# Patient Record
Sex: MALE | Race: WHITE | Employment: FULL TIME | URBAN - METROPOLITAN AREA
[De-identification: names, ages, dates, MRNs, and addresses within clinical notes are randomized per-mention and may not be internally consistent; named-entity substitution may affect disease eponyms.]

---

## 2019-02-23 ENCOUNTER — OFFICE VISIT (OUTPATIENT)
Dept: URGENT CARE | Facility: URGENT CARE | Age: 49
End: 2019-02-23
Payer: COMMERCIAL

## 2019-02-23 VITALS
WEIGHT: 177.13 LBS | SYSTOLIC BLOOD PRESSURE: 106 MMHG | HEART RATE: 81 BPM | TEMPERATURE: 98.1 F | OXYGEN SATURATION: 93 % | DIASTOLIC BLOOD PRESSURE: 66 MMHG | RESPIRATION RATE: 20 BRPM

## 2019-02-23 DIAGNOSIS — Z77.22: ICD-10-CM

## 2019-02-23 DIAGNOSIS — J45.901 MILD ASTHMA WITH EXACERBATION, UNSPECIFIED WHETHER PERSISTENT: Primary | ICD-10-CM

## 2019-02-23 PROCEDURE — 99214 OFFICE O/P EST MOD 30 MIN: CPT | Performed by: PHYSICIAN ASSISTANT

## 2019-02-23 RX ORDER — ALBUTEROL SULFATE 90 UG/1
2 AEROSOL, METERED RESPIRATORY (INHALATION) EVERY 6 HOURS
Qty: 1 INHALER | Refills: 0 | Status: SHIPPED | OUTPATIENT
Start: 2019-02-23 | End: 2020-02-05

## 2019-02-23 RX ORDER — PREDNISONE 20 MG/1
20 TABLET ORAL 2 TIMES DAILY
Qty: 10 TABLET | Refills: 0 | Status: SHIPPED | OUTPATIENT
Start: 2019-02-23 | End: 2019-02-28

## 2019-03-06 NOTE — PROGRESS NOTES
SUBJECTIVE:   Pradeep Swanson is a 48 year old male presenting with a chief complaint of having asthma flare up.  Onset of symptoms was 1 day(s) ago.  Course of illness is same.    Severity moderate  Current and Associated symptoms: wheezing  Treatment measures tried include OTC Cough med.  Predisposing factors include HX of asthma.    PMH:  Asthma    ALLERGIES   No Known Allergies      Social History     Tobacco Use     Smoking status: Never Smoker     Smokeless tobacco: Never Used   Substance Use Topics     Alcohol use: Not on file     Family Hx  Allergies  CVD    ROS:  CONSTITUTIONAL:NEGATIVE for fever, chills, change in weight  INTEGUMENTARY/SKIN: NEGATIVE for worrisome rashes, moles or lesions  EYES: NEGATIVE for vision changes or irritation  ENT/MOUTH: NEGATIVE for ear, mouth and throat problems  RESP:POSITIVE for cough-non productive, Hx asthma and wheezing  CV: NEGATIVE for chest pain, palpitations or peripheral edema  GI: NEGATIVE for nausea, abdominal pain, heartburn, or change in bowel habits  : negative for and dysuria  MUSCULOSKELETAL: NEGATIVE for significant arthralgias or myalgia  NEURO: NEGATIVE for weakness, dizziness or paresthesias    OBJECTIVE  :/66 (Cuff Size: Adult Regular)   Pulse 81   Temp 98.1  F (36.7  C) (Oral)   Resp 20   Wt 80.3 kg (177 lb 2 oz)   SpO2 93%   GENERAL APPEARANCE: healthy, alert and no distress  EYES: EOMI,  PERRL, conjunctiva clear  HENT: ear canals and TM's normal.  Nose and mouth without ulcers, erythema or lesions  NECK: supple, nontender, no lymphadenopathy  RESP: expiratory wheezes generalized  CV: regular rates and rhythm, normal S1 S2, no murmur noted  ABDOMEN:  soft, nontender, no HSM or masses and bowel sounds normal  NEURO: Normal strength and tone, sensory exam grossly normal,  normal speech and mentation  SKIN: no suspicious lesions or rashes    ASSESSMENT/PLAN      ICD-10-CM    1. Mild asthma with exacerbation, unspecified whether persistent  J45.901 albuterol (PROVENTIL HFA) 108 (90 Base) MCG/ACT inhaler     predniSONE (DELTASONE) 20 MG tablet   2. Exposed to tobacco smoke by family members smoking indoors Z77.22 albuterol (PROVENTIL HFA) 108 (90 Base) MCG/ACT inhaler       Orders Placed This Encounter     ALBUTEROL IN     albuterol (PROVENTIL HFA) 108 (90 Base) MCG/ACT inhaler     predniSONE (DELTASONE) 20 MG tablet       Albuterol and prednisone for asthma exacerbation  Follow up with PCP as needed  See orders in Epic

## 2019-03-24 ENCOUNTER — OFFICE VISIT (OUTPATIENT)
Dept: URGENT CARE | Facility: URGENT CARE | Age: 49
End: 2019-03-24
Payer: COMMERCIAL

## 2019-03-24 VITALS
WEIGHT: 173 LBS | DIASTOLIC BLOOD PRESSURE: 70 MMHG | RESPIRATION RATE: 24 BRPM | OXYGEN SATURATION: 92 % | SYSTOLIC BLOOD PRESSURE: 120 MMHG | HEART RATE: 116 BPM | TEMPERATURE: 97.5 F

## 2019-03-24 DIAGNOSIS — J98.01 ACUTE BRONCHOSPASM: Primary | ICD-10-CM

## 2019-03-24 DIAGNOSIS — J45.901 REACTIVE AIRWAY DISEASE WITH ACUTE EXACERBATION, UNSPECIFIED ASTHMA SEVERITY, UNSPECIFIED WHETHER PERSISTENT: ICD-10-CM

## 2019-03-24 PROCEDURE — 94640 AIRWAY INHALATION TREATMENT: CPT | Performed by: FAMILY MEDICINE

## 2019-03-24 PROCEDURE — 99214 OFFICE O/P EST MOD 30 MIN: CPT | Mod: 25 | Performed by: FAMILY MEDICINE

## 2019-03-24 RX ORDER — DIPHENHYDRAMINE HCL 25 MG
25-50 TABLET ORAL EVERY 6 HOURS PRN
Qty: 30 TABLET | Refills: 1 | Status: SHIPPED | OUTPATIENT
Start: 2019-03-24 | End: 2020-02-05

## 2019-03-24 RX ORDER — PREDNISONE 20 MG/1
40 TABLET ORAL DAILY
Qty: 10 TABLET | Refills: 0 | Status: SHIPPED | OUTPATIENT
Start: 2019-03-24 | End: 2019-03-29

## 2019-03-24 RX ORDER — PREDNISONE 10 MG/1
40 TABLET ORAL ONCE
Status: COMPLETED | OUTPATIENT
Start: 2019-03-24 | End: 2019-03-24

## 2019-03-24 RX ORDER — IPRATROPIUM BROMIDE AND ALBUTEROL SULFATE 2.5; .5 MG/3ML; MG/3ML
3 SOLUTION RESPIRATORY (INHALATION) ONCE
Status: COMPLETED | OUTPATIENT
Start: 2019-03-24 | End: 2019-03-24

## 2019-03-24 RX ORDER — ALBUTEROL SULFATE 90 UG/1
2 AEROSOL, METERED RESPIRATORY (INHALATION) EVERY 4 HOURS PRN
Qty: 8.5 G | Refills: 1 | Status: SHIPPED | OUTPATIENT
Start: 2019-03-24

## 2019-03-24 RX ADMIN — PREDNISONE 40 MG: 10 TABLET ORAL at 13:46

## 2019-03-24 RX ADMIN — IPRATROPIUM BROMIDE AND ALBUTEROL SULFATE 3 ML: 2.5; .5 SOLUTION RESPIRATORY (INHALATION) at 13:50

## 2019-03-24 NOTE — PATIENT INSTRUCTIONS
Patient Education     Bronchospasm (Adult)    Bronchospasm occurs when the airways (bronchial tubes) go into spasm and contract. This makes it hard to breathe and causes wheezing (a high-pitched whistling sound). Bronchospasm can also cause frequent coughing without wheezing.  Bronchospasm is due to irritation, inflammation, or allergic reaction of the airways. People with asthma get bronchospasm. However, not everyone with bronchospasm has asthma.  Being exposed to harmful fumes, a recent case of bronchitis, exercise, or a flare-up of chronic obstructive pulmonary disease (COPD) may cause the airways to spasm. An episode of bronchospasm may last 7 to 14 days. Medicine may be prescribed to relax the airways and prevent wheezing. Antibiotics will be prescribed only if your healthcare provider thinks there is a bacterial infection. Antibiotics do not help a viral infection.  Home care    Drink lots of water or other fluids (at least 10 glasses a day) during an attack. This will loosen lung secretions and make it easier to breathe. If you have heart or kidney disease, check with your doctor before you drink extra fluids.    Take prescribed medicine exactly at the times advised. If you take an inhaled medicine to help with breathing, don't use it more than once every 4 hours, unless told to do so. If prescribed an antibiotic or prednisone, take all of the medicine, even if you are feeling better after a few days.    Don't smoke. Also avoid being exposed to secondhand smoke.    If you were given an inhaler, use it exactly as directed. If you need to use it more often than prescribed, your condition may be getting worse. Contact your healthcare provider.  Follow-up care  Follow up with your healthcare provider, or as advised.  If you are age 65 or older, have a chronic lung disease or condition that affects your immune system, or you smoke, ask your healthcare provider about getting a pneumococcal vaccine, as well as a  yearly flu shot (influenza vaccine).  When to seek medical advice  Call your healthcare provider right away if any of these occur:    You need to use your inhalers more often than usual    Fever of 100.4 F (38 C) or higher, or as directed by your healthcare provider    Cough that brings up lots of dark-colored sputum (mucus)    You don't get better within 24 hours  Call 911  Call 911 if any of these occur:    Coughing up bloody sputum (mucus)    Chest pain with each breath    Increased wheezing or shortness of breath   Date Last Reviewed: 6/1/2018 2000-2018 [employer]. [address] [place] [address]. All rights reserved. This information is not intended as a substitute for professional medical care. Always follow your healthcare professional's instructions.           Patient Education     Asthma (Adult)  Asthma is a disease where the medium and  small air passages within the lung go into spasm and restrict the flow of air. Inflammation and swelling of the airways cause further blockage. During an acute asthma attack, these factors cause trouble breathing, wheezing, cough and chest tightness.    An asthma attack can be triggered by many things. Common triggers include infections such as the common cold, bronchitis, and pneumonia. Irritants such as smoke or pollutants in the air, very cold air, emotional upset, and exercise can also trigger an attack. In many adults with asthma, allergies to dust, mold, pollen and animal dander can cause an asthma attack. Skipping doses of daily asthma medicine can also bring on an asthma attack.  Asthma can be controlled using the proper medicines prescribed by your healthcare provider and avoiding exposure to known triggers including allergens and irritants.  Home care    Take prescribed medicine exactly at the times advised. If you need medicine such as from a hand held inhaler or aerosol breathing machine more than every 4 hours, contact your  "healthcare provider or seek immediate medical attention. If prescribed an antibiotic or prednisone, take all of the medicine as prescribed, even if you are feeling better after a few days.    Don't smoke. Avoid being exposed to the smoke of others.    Some people with asthma have worsening of their symptoms when they take aspirin and non-steroidal or fever-reducing medicines like ibuprofen and naproxen. Talk to your healthcare provider if you think this may apply to you.  Follow-up care  Follow up with your healthcare provider, or as advised. Always bring all of your current medicines to any appointments with your healthcare provider. Also bring a complete list of medicines even those not taken for asthma. If you don't already have one, talk to your healthcare provider about developing your own \"Asthma Action Plan.\"  A pneumococcal (pneumonia) vaccine and yearly flu shot (every fall) are recommended. Ask your doctor about this.  When to seek medical advice  Call your healthcare provider right away if any of these occur:     Increased wheezing or shortness of breath    Need to use your inhalers more often than usual without relief    Fever of 100.4 F (38 C) or higher, or as directed by your healthcare provider    Coughing up lots of dark-colored or bloody sputum (mucus)    Chest pain with each breath    If you use a peak flow meter as part of an Asthma Action Plan, and you are still in the yellow zone (50% to 80%) 15 minutes after using inhaler medicine.  Call 911  Call 911 if any of the following occur    Trouble walking or talking because of shortness of breath    If you use a peak flow meter as part of an Asthma Action Plan and you are still in the red zone (less than 50%) 15 minutes after using inhaler medicine    Lips or fingernails turning gray or blue  Date Last Reviewed: 5/1/2017 2000-2018 [employer]. [address] [place] [address], [place] [address]. All rights reserved. This information is not " intended as a substitute for professional medical care. Always follow your healthcare professional's instructions.

## 2019-03-27 NOTE — PROGRESS NOTES
SUBJECTIVE:  Pradeep Swanson, a 48 year old male scheduled an appointment to discuss the following issues:     Acute bronchospasm  Reactive airway disease with acute exacerbation, unspecified asthma severity, unspecified whether persistent    Medical, social, surgical, and family histories reviewed.    Breathing Problem (shortness of breath for 2 days)  Last summer pt had a first episode of smoke exposure (pt is not himself a smoker but his friend smokes) where he was treated with nebulizer, Prednisone and inhalers.    1 month ago his friend smoked around him again and he woke up SOB the next morning.  This is now the third time his roommate smoke around him and he is feeling SOB and wheezing again.  No previous formal diagnosis of asthma.      ROS:  See HPI.  No nausea/vomiting.  No fever/chills.  No chest pain; some SOB.  No BM/urine problems.  No dizziness or syncope.      OBJECTIVE:  /70 (Cuff Size: Adult Regular)   Pulse 116   Temp 97.5  F (36.4  C) (Oral)   Resp 24   Wt 78.5 kg (173 lb)   SpO2 92%   EXAM:  GENERAL APPEARANCE: alert and mild distress; mild tachycardia; afebrile; no cyanosis or accessory muscle use; moist mucus membrane  EYES: Eyes grossly normal to inspection, PERRL and conjunctivae and sclerae normal  HENT: ear canals and TM's normal and nose and mouth without ulcers or lesions  NECK: no adenopathy, no asymmetry, masses, or scars and thyroid normal to palpation  RESP: mild scattered wheezes, fair air entry bilaterally; no crackles  CV: regular rates and rhythm, normal S1 S2, no S3 or S4 and no murmur, click or rub  LYMPHATICS: no cervical adenopathy  ABDOMEN: soft, nontender, without hepatosplenomegaly or masses and bowel sounds normal  MS: extremities normal- no gross deformities noted  SKIN: no suspicious lesions or rashes  NEURO: Normal strength and tone, mentation intact and speech normal      ASSESSMENT/PLAN:  (J98.01) Acute bronchospasm  (primary encounter  diagnosis)  Comment: Duoneb given in clinic and pt improved significantly  Plan: ipratropium - albuterol 0.5 mg/2.5 mg/3 mL         (DUONEB) neb solution 3 mL, predniSONE         (DELTASONE) tablet 40 mg, ALBUTEROL/IPRATROPIUM        3ML NEB, albuterol (PROAIR HFA/PROVENTIL         HFA/VENTOLIN HFA) 108 (90 Base) MCG/ACT         inhaler, predniSONE (DELTASONE) 20 MG tablet,         diphenhydrAMINE (BENADRYL) 25 MG tablet      (J45.901) Reactive airway disease with acute exacerbation, unspecified asthma severity, unspecified whether persistent  Comment:  Related to smoke exposure  Plan: PULMONARY MEDICINE REFERRAL         Pt to f/up PCP within 2-3 weeks, sooner if no improvement or new problems arise.  Warning signs and symptoms explained---be seen ASAP if worsening.

## 2020-02-05 ENCOUNTER — OFFICE VISIT (OUTPATIENT)
Dept: URGENT CARE | Facility: URGENT CARE | Age: 50
End: 2020-02-05
Payer: COMMERCIAL

## 2020-02-05 ENCOUNTER — APPOINTMENT (OUTPATIENT)
Dept: GENERAL RADIOLOGY | Facility: CLINIC | Age: 50
DRG: 193 | End: 2020-02-05
Attending: PHYSICIAN ASSISTANT
Payer: COMMERCIAL

## 2020-02-05 ENCOUNTER — HOSPITAL ENCOUNTER (INPATIENT)
Facility: CLINIC | Age: 50
LOS: 2 days | Discharge: HOME | DRG: 193 | End: 2020-02-07
Attending: PHYSICIAN ASSISTANT | Admitting: HOSPITALIST
Payer: COMMERCIAL

## 2020-02-05 ENCOUNTER — APPOINTMENT (OUTPATIENT)
Dept: CT IMAGING | Facility: CLINIC | Age: 50
DRG: 193 | End: 2020-02-05
Attending: PHYSICIAN ASSISTANT
Payer: COMMERCIAL

## 2020-02-05 VITALS
SYSTOLIC BLOOD PRESSURE: 118 MMHG | OXYGEN SATURATION: 86 % | DIASTOLIC BLOOD PRESSURE: 80 MMHG | HEART RATE: 148 BPM | WEIGHT: 168 LBS | TEMPERATURE: 101 F

## 2020-02-05 DIAGNOSIS — J20.9 ACUTE BRONCHITIS, UNSPECIFIED ORGANISM: ICD-10-CM

## 2020-02-05 DIAGNOSIS — J45.31 MILD PERSISTENT ASTHMA WITH EXACERBATION: ICD-10-CM

## 2020-02-05 DIAGNOSIS — J10.1 INFLUENZA A: ICD-10-CM

## 2020-02-05 DIAGNOSIS — R05.9 COUGH: ICD-10-CM

## 2020-02-05 DIAGNOSIS — J45.901 SEVERE ASTHMA WITH EXACERBATION, UNSPECIFIED WHETHER PERSISTENT: ICD-10-CM

## 2020-02-05 DIAGNOSIS — R91.8 PULMONARY NODULES: ICD-10-CM

## 2020-02-05 DIAGNOSIS — J96.01 ACUTE RESPIRATORY FAILURE WITH HYPOXIA (H): Primary | ICD-10-CM

## 2020-02-05 DIAGNOSIS — R06.03 ACUTE RESPIRATORY DISTRESS: ICD-10-CM

## 2020-02-05 DIAGNOSIS — R09.02 HYPOXIA: ICD-10-CM

## 2020-02-05 DIAGNOSIS — R00.0 TACHYCARDIA: ICD-10-CM

## 2020-02-05 DIAGNOSIS — R09.02 HYPOXIA: Primary | ICD-10-CM

## 2020-02-05 DIAGNOSIS — R68.83 CHILLS: ICD-10-CM

## 2020-02-05 LAB
ABO + RH BLD: NORMAL
ABO + RH BLD: NORMAL
ALBUMIN SERPL-MCNC: 4.6 G/DL (ref 3.4–5)
ALP SERPL-CCNC: 66 U/L (ref 40–150)
ALT SERPL W P-5'-P-CCNC: 35 U/L (ref 0–70)
ANION GAP SERPL CALCULATED.3IONS-SCNC: 6 MMOL/L (ref 3–14)
AST SERPL W P-5'-P-CCNC: 16 U/L (ref 0–45)
BASE DEFICIT BLDA-SCNC: 3 MMOL/L
BASOPHILS # BLD AUTO: 0 10E9/L (ref 0–0.2)
BASOPHILS NFR BLD AUTO: 0.2 %
BILIRUB SERPL-MCNC: 0.3 MG/DL (ref 0.2–1.3)
BLD GP AB SCN SERPL QL: NORMAL
BLOOD BANK CMNT PATIENT-IMP: NORMAL
BUN SERPL-MCNC: 16 MG/DL (ref 7–30)
CALCIUM SERPL-MCNC: 9.4 MG/DL (ref 8.5–10.1)
CHLORIDE SERPL-SCNC: 107 MMOL/L (ref 94–109)
CO2 SERPL-SCNC: 25 MMOL/L (ref 20–32)
CREAT SERPL-MCNC: 0.96 MG/DL (ref 0.66–1.25)
D DIMER PPP FEU-MCNC: 0.3 UG/ML FEU (ref 0–0.5)
DIFFERENTIAL METHOD BLD: ABNORMAL
EOSINOPHIL # BLD AUTO: 0.5 10E9/L (ref 0–0.7)
EOSINOPHIL NFR BLD AUTO: 3.4 %
ERYTHROCYTE [DISTWIDTH] IN BLOOD BY AUTOMATED COUNT: 12.6 % (ref 10–15)
FLUAV+FLUBV AG SPEC QL: NEGATIVE
FLUAV+FLUBV AG SPEC QL: NEGATIVE
FLUAV+FLUBV RNA SPEC QL NAA+PROBE: NEGATIVE
FLUAV+FLUBV RNA SPEC QL NAA+PROBE: POSITIVE
GFR SERPL CREATININE-BSD FRML MDRD: >90 ML/MIN/{1.73_M2}
GLUCOSE BLDC GLUCOMTR-MCNC: 174 MG/DL (ref 70–99)
GLUCOSE SERPL-MCNC: 113 MG/DL (ref 70–99)
HCO3 BLD-SCNC: 21 MMOL/L (ref 21–28)
HCT VFR BLD AUTO: 46.8 % (ref 40–53)
HGB BLD-MCNC: 15.9 G/DL (ref 13.3–17.7)
IMM GRANULOCYTES # BLD: 0 10E9/L (ref 0–0.4)
IMM GRANULOCYTES NFR BLD: 0.1 %
LACTATE BLD-SCNC: 1.5 MMOL/L (ref 0.7–2)
LIPASE SERPL-CCNC: 59 U/L (ref 73–393)
LYMPHOCYTES # BLD AUTO: 0.8 10E9/L (ref 0.8–5.3)
LYMPHOCYTES NFR BLD AUTO: 5.7 %
MAGNESIUM SERPL-MCNC: 1.9 MG/DL (ref 1.6–2.3)
MCH RBC QN AUTO: 30.9 PG (ref 26.5–33)
MCHC RBC AUTO-ENTMCNC: 34 G/DL (ref 31.5–36.5)
MCV RBC AUTO: 91 FL (ref 78–100)
MONOCYTES # BLD AUTO: 0.5 10E9/L (ref 0–1.3)
MONOCYTES NFR BLD AUTO: 3.5 %
NEUTROPHILS # BLD AUTO: 11.9 10E9/L (ref 1.6–8.3)
NEUTROPHILS NFR BLD AUTO: 87.1 %
NRBC # BLD AUTO: 0 10*3/UL
NRBC BLD AUTO-RTO: 0 /100
O2/TOTAL GAS SETTING VFR VENT: ABNORMAL %
OXYHGB MFR BLD: 89 % (ref 92–100)
PCO2 BLD: 32 MM HG (ref 35–45)
PH BLD: 7.42 PH (ref 7.35–7.45)
PLATELET # BLD AUTO: 232 10E9/L (ref 150–450)
PO2 BLD: 54 MM HG (ref 80–105)
POTASSIUM SERPL-SCNC: 3.6 MMOL/L (ref 3.4–5.3)
PROCALCITONIN SERPL-MCNC: 0.27 NG/ML
PROT SERPL-MCNC: 8.3 G/DL (ref 6.8–8.8)
RBC # BLD AUTO: 5.14 10E12/L (ref 4.4–5.9)
RSV RNA SPEC NAA+PROBE: NEGATIVE
SODIUM SERPL-SCNC: 138 MMOL/L (ref 133–144)
SPECIMEN EXP DATE BLD: NORMAL
SPECIMEN SOURCE: ABNORMAL
SPECIMEN SOURCE: NORMAL
TROPONIN I SERPL-MCNC: <0.015 UG/L (ref 0–0.04)
WBC # BLD AUTO: 13.6 10E9/L (ref 4–11)

## 2020-02-05 PROCEDURE — 25000128 H RX IP 250 OP 636: Performed by: HOSPITALIST

## 2020-02-05 PROCEDURE — 86850 RBC ANTIBODY SCREEN: CPT | Performed by: PHYSICIAN ASSISTANT

## 2020-02-05 PROCEDURE — 25000125 ZZHC RX 250: Performed by: PHYSICIAN ASSISTANT

## 2020-02-05 PROCEDURE — 83605 ASSAY OF LACTIC ACID: CPT | Performed by: PHYSICIAN ASSISTANT

## 2020-02-05 PROCEDURE — 25800030 ZZH RX IP 258 OP 636: Performed by: HOSPITALIST

## 2020-02-05 PROCEDURE — 94640 AIRWAY INHALATION TREATMENT: CPT | Performed by: PHYSICIAN ASSISTANT

## 2020-02-05 PROCEDURE — 25800030 ZZH RX IP 258 OP 636: Performed by: EMERGENCY MEDICINE

## 2020-02-05 PROCEDURE — 93005 ELECTROCARDIOGRAM TRACING: CPT

## 2020-02-05 PROCEDURE — 25000128 H RX IP 250 OP 636: Performed by: PHYSICIAN ASSISTANT

## 2020-02-05 PROCEDURE — 96361 HYDRATE IV INFUSION ADD-ON: CPT

## 2020-02-05 PROCEDURE — 25000125 ZZHC RX 250: Performed by: EMERGENCY MEDICINE

## 2020-02-05 PROCEDURE — 84484 ASSAY OF TROPONIN QUANT: CPT | Performed by: PHYSICIAN ASSISTANT

## 2020-02-05 PROCEDURE — 40000275 ZZH STATISTIC RCP TIME EA 10 MIN

## 2020-02-05 PROCEDURE — 40000281 ZZH STATISTIC TRANSPORT TIME EA 15 MIN

## 2020-02-05 PROCEDURE — 87040 BLOOD CULTURE FOR BACTERIA: CPT | Performed by: PHYSICIAN ASSISTANT

## 2020-02-05 PROCEDURE — 96375 TX/PRO/DX INJ NEW DRUG ADDON: CPT

## 2020-02-05 PROCEDURE — 87631 RESP VIRUS 3-5 TARGETS: CPT | Performed by: EMERGENCY MEDICINE

## 2020-02-05 PROCEDURE — 84145 PROCALCITONIN (PCT): CPT | Performed by: PHYSICIAN ASSISTANT

## 2020-02-05 PROCEDURE — 71275 CT ANGIOGRAPHY CHEST: CPT

## 2020-02-05 PROCEDURE — 96365 THER/PROPH/DIAG IV INF INIT: CPT | Mod: 59

## 2020-02-05 PROCEDURE — 94660 CPAP INITIATION&MGMT: CPT

## 2020-02-05 PROCEDURE — 80053 COMPREHEN METABOLIC PANEL: CPT | Performed by: PHYSICIAN ASSISTANT

## 2020-02-05 PROCEDURE — 71046 X-RAY EXAM CHEST 2 VIEWS: CPT

## 2020-02-05 PROCEDURE — 96367 TX/PROPH/DG ADDL SEQ IV INF: CPT

## 2020-02-05 PROCEDURE — 83690 ASSAY OF LIPASE: CPT | Performed by: PHYSICIAN ASSISTANT

## 2020-02-05 PROCEDURE — 86900 BLOOD TYPING SEROLOGIC ABO: CPT | Performed by: PHYSICIAN ASSISTANT

## 2020-02-05 PROCEDURE — 00000146 ZZHCL STATISTIC GLUCOSE BY METER IP

## 2020-02-05 PROCEDURE — 86901 BLOOD TYPING SEROLOGIC RH(D): CPT | Performed by: PHYSICIAN ASSISTANT

## 2020-02-05 PROCEDURE — 85025 COMPLETE CBC W/AUTO DIFF WBC: CPT | Performed by: PHYSICIAN ASSISTANT

## 2020-02-05 PROCEDURE — 27210338 ZZH CIRCUIT HUMID FACE/TRACH MSK

## 2020-02-05 PROCEDURE — 99207 ZZC OFFICE-HOSPITAL ADMIT: CPT | Performed by: PHYSICIAN ASSISTANT

## 2020-02-05 PROCEDURE — 87804 INFLUENZA ASSAY W/OPTIC: CPT | Performed by: PHYSICIAN ASSISTANT

## 2020-02-05 PROCEDURE — 82805 BLOOD GASES W/O2 SATURATION: CPT | Performed by: EMERGENCY MEDICINE

## 2020-02-05 PROCEDURE — 99223 1ST HOSP IP/OBS HIGH 75: CPT | Mod: AI | Performed by: HOSPITALIST

## 2020-02-05 PROCEDURE — 94640 AIRWAY INHALATION TREATMENT: CPT | Mod: 76

## 2020-02-05 PROCEDURE — 20000003 ZZH R&B ICU

## 2020-02-05 PROCEDURE — 25800030 ZZH RX IP 258 OP 636: Performed by: PHYSICIAN ASSISTANT

## 2020-02-05 PROCEDURE — 83735 ASSAY OF MAGNESIUM: CPT | Performed by: PHYSICIAN ASSISTANT

## 2020-02-05 PROCEDURE — 96366 THER/PROPH/DIAG IV INF ADDON: CPT

## 2020-02-05 PROCEDURE — 96372 THER/PROPH/DIAG INJ SC/IM: CPT

## 2020-02-05 PROCEDURE — 85379 FIBRIN DEGRADATION QUANT: CPT | Performed by: PHYSICIAN ASSISTANT

## 2020-02-05 PROCEDURE — 99291 CRITICAL CARE FIRST HOUR: CPT | Mod: 25

## 2020-02-05 PROCEDURE — 94640 AIRWAY INHALATION TREATMENT: CPT

## 2020-02-05 PROCEDURE — 27210339 ZZH CIRCUIT HUMIDITY W/CPAP BIP

## 2020-02-05 PROCEDURE — 25000125 ZZHC RX 250

## 2020-02-05 RX ORDER — CEFTRIAXONE 1 G/1
1 INJECTION, POWDER, FOR SOLUTION INTRAMUSCULAR; INTRAVENOUS ONCE
Status: COMPLETED | OUTPATIENT
Start: 2020-02-05 | End: 2020-02-05

## 2020-02-05 RX ORDER — PROCHLORPERAZINE 25 MG
25 SUPPOSITORY, RECTAL RECTAL EVERY 12 HOURS PRN
Status: DISCONTINUED | OUTPATIENT
Start: 2020-02-05 | End: 2020-02-07 | Stop reason: HOSPADM

## 2020-02-05 RX ORDER — IPRATROPIUM BROMIDE AND ALBUTEROL SULFATE 2.5; .5 MG/3ML; MG/3ML
3 SOLUTION RESPIRATORY (INHALATION) ONCE
Status: COMPLETED | OUTPATIENT
Start: 2020-02-05 | End: 2020-02-05

## 2020-02-05 RX ORDER — IPRATROPIUM BROMIDE AND ALBUTEROL SULFATE 2.5; .5 MG/3ML; MG/3ML
3 SOLUTION RESPIRATORY (INHALATION) EVERY 4 HOURS PRN
Status: DISCONTINUED | OUTPATIENT
Start: 2020-02-05 | End: 2020-02-07 | Stop reason: HOSPADM

## 2020-02-05 RX ORDER — NALOXONE HYDROCHLORIDE 0.4 MG/ML
.1-.4 INJECTION, SOLUTION INTRAMUSCULAR; INTRAVENOUS; SUBCUTANEOUS
Status: DISCONTINUED | OUTPATIENT
Start: 2020-02-05 | End: 2020-02-07 | Stop reason: HOSPADM

## 2020-02-05 RX ORDER — LEVALBUTEROL 1.25 MG/.5ML
1.25 SOLUTION, CONCENTRATE RESPIRATORY (INHALATION) EVERY 6 HOURS
Status: DISCONTINUED | OUTPATIENT
Start: 2020-02-06 | End: 2020-02-07 | Stop reason: HOSPADM

## 2020-02-05 RX ORDER — IPRATROPIUM BROMIDE AND ALBUTEROL SULFATE 2.5; .5 MG/3ML; MG/3ML
SOLUTION RESPIRATORY (INHALATION)
Status: COMPLETED
Start: 2020-02-05 | End: 2020-02-05

## 2020-02-05 RX ORDER — SODIUM CHLORIDE 9 MG/ML
INJECTION, SOLUTION INTRAVENOUS CONTINUOUS
Status: DISCONTINUED | OUTPATIENT
Start: 2020-02-05 | End: 2020-02-07

## 2020-02-05 RX ORDER — NITROGLYCERIN 0.4 MG/1
0.4 TABLET SUBLINGUAL EVERY 5 MIN PRN
Status: DISCONTINUED | OUTPATIENT
Start: 2020-02-05 | End: 2020-02-07 | Stop reason: HOSPADM

## 2020-02-05 RX ORDER — BISACODYL 10 MG
10 SUPPOSITORY, RECTAL RECTAL DAILY PRN
Status: DISCONTINUED | OUTPATIENT
Start: 2020-02-05 | End: 2020-02-07 | Stop reason: HOSPADM

## 2020-02-05 RX ORDER — IOPAMIDOL 755 MG/ML
65 INJECTION, SOLUTION INTRAVASCULAR ONCE
Status: COMPLETED | OUTPATIENT
Start: 2020-02-05 | End: 2020-02-05

## 2020-02-05 RX ORDER — LIDOCAINE 40 MG/G
CREAM TOPICAL
Status: DISCONTINUED | OUTPATIENT
Start: 2020-02-05 | End: 2020-02-07 | Stop reason: HOSPADM

## 2020-02-05 RX ORDER — GUAIFENESIN/DEXTROMETHORPHAN 100-10MG/5
10 SYRUP ORAL EVERY 4 HOURS PRN
Status: DISCONTINUED | OUTPATIENT
Start: 2020-02-05 | End: 2020-02-07 | Stop reason: HOSPADM

## 2020-02-05 RX ORDER — OSELTAMIVIR PHOSPHATE 75 MG/1
75 CAPSULE ORAL 2 TIMES DAILY
Status: DISCONTINUED | OUTPATIENT
Start: 2020-02-05 | End: 2020-02-07 | Stop reason: HOSPADM

## 2020-02-05 RX ORDER — AZITHROMYCIN 500 MG/1
500 INJECTION, POWDER, LYOPHILIZED, FOR SOLUTION INTRAVENOUS ONCE
Status: COMPLETED | OUTPATIENT
Start: 2020-02-05 | End: 2020-02-05

## 2020-02-05 RX ORDER — ACETAMINOPHEN 325 MG/1
650 TABLET ORAL EVERY 4 HOURS PRN
Status: DISCONTINUED | OUTPATIENT
Start: 2020-02-05 | End: 2020-02-07 | Stop reason: HOSPADM

## 2020-02-05 RX ORDER — METHYLPREDNISOLONE SODIUM SUCCINATE 125 MG/2ML
60 INJECTION, POWDER, LYOPHILIZED, FOR SOLUTION INTRAMUSCULAR; INTRAVENOUS EVERY 6 HOURS
Status: DISCONTINUED | OUTPATIENT
Start: 2020-02-05 | End: 2020-02-07

## 2020-02-05 RX ORDER — PROCHLORPERAZINE MALEATE 5 MG
10 TABLET ORAL EVERY 6 HOURS PRN
Status: DISCONTINUED | OUTPATIENT
Start: 2020-02-05 | End: 2020-02-07 | Stop reason: HOSPADM

## 2020-02-05 RX ORDER — CEFTRIAXONE 2 G/1
2 INJECTION, POWDER, FOR SOLUTION INTRAMUSCULAR; INTRAVENOUS EVERY 24 HOURS
Status: DISCONTINUED | OUTPATIENT
Start: 2020-02-06 | End: 2020-02-07 | Stop reason: HOSPADM

## 2020-02-05 RX ORDER — AMOXICILLIN 250 MG
2 CAPSULE ORAL 2 TIMES DAILY PRN
Status: DISCONTINUED | OUTPATIENT
Start: 2020-02-05 | End: 2020-02-07 | Stop reason: HOSPADM

## 2020-02-05 RX ORDER — ONDANSETRON 2 MG/ML
4 INJECTION INTRAMUSCULAR; INTRAVENOUS EVERY 6 HOURS PRN
Status: DISCONTINUED | OUTPATIENT
Start: 2020-02-05 | End: 2020-02-07 | Stop reason: HOSPADM

## 2020-02-05 RX ORDER — IPRATROPIUM BROMIDE AND ALBUTEROL SULFATE 2.5; .5 MG/3ML; MG/3ML
3 SOLUTION RESPIRATORY (INHALATION) ONCE
Status: DISCONTINUED | OUTPATIENT
Start: 2020-02-05 | End: 2020-02-05

## 2020-02-05 RX ORDER — MAGNESIUM SULFATE HEPTAHYDRATE 40 MG/ML
2 INJECTION, SOLUTION INTRAVENOUS ONCE
Status: COMPLETED | OUTPATIENT
Start: 2020-02-05 | End: 2020-02-05

## 2020-02-05 RX ORDER — LEVALBUTEROL 1.25 MG/.5ML
1.25 SOLUTION, CONCENTRATE RESPIRATORY (INHALATION) EVERY 10 MIN PRN
Status: DISCONTINUED | OUTPATIENT
Start: 2020-02-05 | End: 2020-02-05 | Stop reason: DRUGHIGH

## 2020-02-05 RX ORDER — ONDANSETRON 4 MG/1
4 TABLET, ORALLY DISINTEGRATING ORAL EVERY 6 HOURS PRN
Status: DISCONTINUED | OUTPATIENT
Start: 2020-02-05 | End: 2020-02-07 | Stop reason: HOSPADM

## 2020-02-05 RX ORDER — AMOXICILLIN 250 MG
1 CAPSULE ORAL 2 TIMES DAILY PRN
Status: DISCONTINUED | OUTPATIENT
Start: 2020-02-05 | End: 2020-02-07 | Stop reason: HOSPADM

## 2020-02-05 RX ORDER — LORAZEPAM 2 MG/ML
0.5 INJECTION INTRAMUSCULAR ONCE
Status: COMPLETED | OUTPATIENT
Start: 2020-02-05 | End: 2020-02-05

## 2020-02-05 RX ORDER — LIDOCAINE 40 MG/G
CREAM TOPICAL
Status: DISCONTINUED | OUTPATIENT
Start: 2020-02-05 | End: 2020-02-06

## 2020-02-05 RX ORDER — METHYLPREDNISOLONE SODIUM SUCCINATE 125 MG/2ML
125 INJECTION, POWDER, LYOPHILIZED, FOR SOLUTION INTRAMUSCULAR; INTRAVENOUS ONCE
Status: COMPLETED | OUTPATIENT
Start: 2020-02-05 | End: 2020-02-05

## 2020-02-05 RX ADMIN — EPINEPHRINE 0.3 MG: 1 INJECTION INTRAMUSCULAR; INTRAVENOUS; SUBCUTANEOUS at 20:23

## 2020-02-05 RX ADMIN — METHYLPREDNISOLONE SODIUM SUCCINATE 62.5 MG: 125 INJECTION, POWDER, FOR SOLUTION INTRAMUSCULAR; INTRAVENOUS at 21:53

## 2020-02-05 RX ADMIN — IPRATROPIUM BROMIDE AND ALBUTEROL SULFATE 3 ML: 2.5; .5 SOLUTION RESPIRATORY (INHALATION) at 13:22

## 2020-02-05 RX ADMIN — LORAZEPAM 0.5 MG: 2 INJECTION INTRAMUSCULAR; INTRAVENOUS at 19:15

## 2020-02-05 RX ADMIN — LEVALBUTEROL HYDROCHLORIDE 1.25 MG: 1.25 SOLUTION, CONCENTRATE RESPIRATORY (INHALATION) at 20:51

## 2020-02-05 RX ADMIN — CEFTRIAXONE 1 G: 1 INJECTION, POWDER, FOR SOLUTION INTRAMUSCULAR; INTRAVENOUS at 16:50

## 2020-02-05 RX ADMIN — SODIUM CHLORIDE: 9 INJECTION, SOLUTION INTRAVENOUS at 20:23

## 2020-02-05 RX ADMIN — IPRATROPIUM BROMIDE AND ALBUTEROL SULFATE 3 ML: .5; 3 SOLUTION RESPIRATORY (INHALATION) at 14:50

## 2020-02-05 RX ADMIN — IOPAMIDOL 65 ML: 755 INJECTION, SOLUTION INTRAVENOUS at 16:36

## 2020-02-05 RX ADMIN — SODIUM CHLORIDE: 9 INJECTION, SOLUTION INTRAVENOUS at 21:16

## 2020-02-05 RX ADMIN — IPRATROPIUM BROMIDE AND ALBUTEROL SULFATE 3 ML: .5; 3 SOLUTION RESPIRATORY (INHALATION) at 16:04

## 2020-02-05 RX ADMIN — IPRATROPIUM BROMIDE AND ALBUTEROL SULFATE 3 ML: .5; 3 SOLUTION RESPIRATORY (INHALATION) at 15:47

## 2020-02-05 RX ADMIN — AZITHROMYCIN MONOHYDRATE 500 MG: 500 INJECTION, POWDER, LYOPHILIZED, FOR SOLUTION INTRAVENOUS at 17:35

## 2020-02-05 RX ADMIN — LEVALBUTEROL HYDROCHLORIDE 1.25 MG: 1.25 SOLUTION, CONCENTRATE RESPIRATORY (INHALATION) at 20:39

## 2020-02-05 RX ADMIN — CEFTRIAXONE 1 G: 1 INJECTION, POWDER, FOR SOLUTION INTRAMUSCULAR; INTRAVENOUS at 21:54

## 2020-02-05 RX ADMIN — MAGNESIUM SULFATE HEPTAHYDRATE 2 G: 40 INJECTION, SOLUTION INTRAVENOUS at 16:05

## 2020-02-05 RX ADMIN — SODIUM CHLORIDE 90 ML: 9 INJECTION, SOLUTION INTRAVENOUS at 16:36

## 2020-02-05 RX ADMIN — IPRATROPIUM BROMIDE AND ALBUTEROL SULFATE 3 ML: 2.5; .5 SOLUTION RESPIRATORY (INHALATION) at 16:04

## 2020-02-05 RX ADMIN — SODIUM CHLORIDE 1000 ML: 9 INJECTION, SOLUTION INTRAVENOUS at 17:41

## 2020-02-05 RX ADMIN — SODIUM CHLORIDE 1000 ML: 9 INJECTION, SOLUTION INTRAVENOUS at 15:15

## 2020-02-05 RX ADMIN — METHYLPREDNISOLONE SODIUM SUCCINATE 125 MG: 125 INJECTION, POWDER, FOR SOLUTION INTRAMUSCULAR; INTRAVENOUS at 14:50

## 2020-02-05 ASSESSMENT — ENCOUNTER SYMPTOMS
FEVER: 0
COUGH: 1
ABDOMINAL PAIN: 0
HEADACHES: 1
SHORTNESS OF BREATH: 1
SHORTNESS OF BREATH: 1
DIARRHEA: 0
VOMITING: 0
CHILLS: 1
FOCAL WEAKNESS: 0
FEVER: 1
HEADACHES: 0
ABDOMINAL PAIN: 0
COUGH: 1
NAUSEA: 0

## 2020-02-05 ASSESSMENT — MIFFLIN-ST. JEOR
SCORE: 1653.24
SCORE: 1633.13

## 2020-02-05 NOTE — PROGRESS NOTES
HPI  February 5, 2020    HPI: Pradeep Swanson is a 49 year old male who complains of moderate cough onset 2 weeks ago, with onset of SOB and chills last night. SOB is constant. He reports he was just diagnosed with asthma on 1/27/20 and started on Asmanex as well as albuterol. He has tried his albuterol without improvement.  DeniesHA, CP, abd pain, N/V/D, rash, or any other symptoms. Patient's son has a cold currently.    No past medical history on file.  No past surgical history on file.  Social History     Tobacco Use     Smoking status: Never Smoker     Smokeless tobacco: Never Used   Substance Use Topics     Alcohol use: None     Drug use: None     There is no problem list on file for this patient.    No family history on file.     Problem list, Medication list, Allergies, and Medical/Social/Surgical histories reviewed in [facility] and updated as appropriate.      Review of Systems   Constitutional: Positive for chills. Negative for fever.   Respiratory: Positive for cough and shortness of breath.    Cardiovascular: Negative for chest pain.   Gastrointestinal: Negative for abdominal pain, diarrhea, nausea and vomiting.   Skin: Negative for rash.   Neurological: Negative for focal weakness and headaches.   All other systems reviewed and are negative.      Physical Exam  Vitals signs and nursing note reviewed.   Constitutional:       Appearance: He is ill-appearing.   HENT:      Head: Normocephalic and atraumatic.      Nose: Nose normal.   Eyes:      Conjunctiva/sclera: Conjunctivae normal.   Cardiovascular:      Rate and Rhythm: Regular rhythm. Tachycardia present.      Heart sounds: Normal heart sounds.   Pulmonary:      Effort: Pulmonary effort is normal.      Breath sounds: Decreased air movement present. Wheezing (diffuse inspiratory & expiratory) present.   Musculoskeletal: Normal range of motion.   Skin:     General: Skin is warm and dry.   Neurological:      Mental Status: He is alert and oriented to person,  place, and time.   Psychiatric:         Behavior: Behavior normal.       Vital Signs  /80   Pulse 148   Temp 101  F (38.3  C) (Oral)   Wt 76.2 kg (168 lb)   SpO2 (!) 86%      Diagnostic Test Results:  none     ASSESSMENT/PLAN      ICD-10-CM    1. Hypoxia R09.02 ipratropium - albuterol 0.5 mg/2.5 mg/3 mL (DUONEB) neb solution 3 mL     INHALATION/NEBULIZER TREATMENT, INITIAL   2. Mild persistent asthma with exacerbation J45.31    3. Cough R05    4. Chills R68.83       1:15 PM  Patient's O2 sat noted to be 86%, patient placed on 4 L/min of O2 by nasal cannula, O2 increased to 87-88%. He is speaking in complete sentences but has decreased air movement and diffuse wheezing on exam. He is tachycardic & febrile.   I advised pt go to ER at [facility] by EMS. He declines EMS but will call his wife to take him to the ER. He understands the risks of declining EMS transport including death.  In the meantime I have ordered a Duoneb treatment.  Report called ahead to ED.  1:30 PM  O2 sat increased to 90%. Patient receiving Duoneb. Reports mild improvement in SOB. No improvement on lung exam. Patient's wife here to take him to ER.     I have discussed any lab or imaging results, the patient's diagnosis, and my plan of treatment with the patient and/or family. Patient is aware to come back in if with worsening symptoms or if no relief despite treatment plan.  Patient voiced understanding and had no further questions.       Follow Up: Return in about 1 week (around 2/12/2020) for Follow up w/ primary care provider after ER visit.    DWAYNE Gibbs, PARandolphC  [facility]

## 2020-02-05 NOTE — H&P
[facility]  History and Physical   Hospitalist  Kevin George MD       Pradeep Swanson MRN# [id number]   YOB: 1970 Age: 49 year old      Date of Admission:  2/5/2020         Assessment and Plan:   Pradeep Swanson is a 49 year old male with PMH significant for asthma who presented to ER with shortness of breath    #Acute hypoxic respiratory failure secondary to severe asthma exacerbation  #fever, likely bronchitis  - he is febrile to 102, tachycardic to 130s, wbc 13.6, protocol 0.27, normal lactate;  and on 10 L oxymask will initially requiring non-rebreather  -admit as inpatient; getting ABG in ER and will have a trial of BiPAP; if remains stable off BiPAP-- med-tele, otherwise will admit to [facility]MC for close monitoring   -Xopenex nebs Q6 hours scheduled ; duo nebs Q4 hours PRN; Solu-Medrol 60 mg IV Q6 hours  -CT chest showed no PE, did show likely bronchitis with hilar lymphadenopathy  -continue empiric Rocephin and azithromycin initiated the ER  -Tylenol PRN for fever; awaiting blood culture; influenza negative    1907 Addendum  - patient dyspneic despite treatment in ED along with IM epi  - ABG with pCO2 of 32  - will admit him to ICU  - will check Influenza and RSV PCR  - signed out to intensivist; might need to be intubated if respiratory status worsens    #Incidental pulmonary nodule  -noted lung nodule maximum 6 mm size  -he quit smoking in 1997, low risk, no routine follow-up needed per radiology    # DVT prophylaxis: mechanical with PCD boots  # Code status: full code           Primary Care Physician:   No Ref-Primary, Physician None         Chief Complaint:   fever and shortness of breath    History is obtained from the patient         History of Present Illness:   Pradeep Swanson is a 49 year old male with PMH significant for asthma who presented to ER with shortness of breath. He states his three years old son has been with cold recently. He started having  "shortness of breath last evening which got progressively worse today and thus went to urgent care when he was noted to saturate 86% on room air. He was febrile 102 and noted tachycardic with heart rate 130s. He was sent to ER for further evaluation.    The patient denies any chills, rigors, chest pain .  Denies pain abdomen.  No bowel or bladder disturbances.    In ED patient was seen by ABEL Bonds, he was started on NRB, was given do nebs, Solu-Medrol, IV magnesium, Rocephin and azithromycin . His breathing status slightly improved but still dyspneic . Hospitalist was requested admission for further evaluation.               Past Medical History:     Mild asthma          Past Surgical History:   No past surgical history on file.           Home Medications:     Prior to Admission Medications   Prescriptions Last Dose Informant Patient Reported? Taking?   albuterol (PROAIR HFA/PROVENTIL HFA/VENTOLIN HFA) 108 (90 Base) MCG/ACT inhaler 2/5/2020 at Unknown time  No Yes   Sig: Inhale 2 puffs into the lungs every 4 hours as needed for shortness of breath / dyspnea or wheezing      Facility-Administered Medications: None            Allergies:   No Known Allergies         Social History:   Pradeep Swanson  reports that he has never smoked. He has never used smokeless tobacco. he quit smoking in 1997, reports occasional alcohol use, no illicit drug use              Family History:   Pradeep Swanson family history is not on file.    Family history was reviewed by myself and not pertinent to current presentation.           Review of Systems:   A10 point Review of Systems was done and were negative other than noted in the HPI.             Physical Exam:   Blood pressure 117/77, pulse 148, temperature 99.9  F (37.7  C), temperature source Oral, resp. rate 22, height 1.803 m (5' 11\"), weight 77.1 kg (170 lb), SpO2 90 %.  170 lbs 0 oz      Constitutional: Alert, awake and orienetd X 3; in moderate respiratory distress, speaking " in full sentences ; on Bipap   HEENT: Pupils equal and reactive to light and accomodation, EOMI intact; neck supple no raised JVD or rigidity    Oral cavity: Moist mucosa   Cardiovascular: Normal s1 s2, regular rate and rhythm, no murmur   Lungs: B/l diffuse wheezings; no crepitations   Abdomen: Soft, nt, nd, no guarding, rigidity or rebound; BS +   LE : No edema   Musculoskeletal: Power 5/5 in all extremities   Neuro: No focal neurological deficits noted, CN II to XII grossly intact   Psychiatry: anxious  Skin: No obvious skin rashes or ulcers             Data:   All new lab and imaging data was reviewed in Epic.   Significant labs and imagings include:    CMP unremarkable, negative troponin, pro calcitonin 0.27  CBC with WBC 13.6  influenza negative; normal D dimer   blood culture pending  CT chest PE protocol noted with no PE, did note likely bronchitis, hilar lymphadenopathy and lung nodule             Kevin George MD  Hospitalist

## 2020-02-05 NOTE — ED NOTES
"[facility]  ED Nurse Handoff Report    ED Chief complaint: Shortness of Breath      ED Diagnosis:   Final diagnoses:   Acute respiratory distress   Hypoxia   Severe asthma with exacerbation, unspecified whether persistent   Acute bronchitis, unspecified organism   Tachycardia       Code Status: Full Code    Allergies: No Known Allergies    Patient Story: Worsening SOB with cough since last night. Recent dx of asthma. Sent to ED from  for further eval of SOB.  Focused Assessment:  Denies CP. Tachycardic 120-130 bpm. +SOB, 86% on room air. Pt currently on 10L oxymask w/ saturations from 93-94%. Mild increased work of breathing. Intermittent cough.     Treatments and/or interventions provided: Duoneb x3, 1L NS bolus x2, rocephin 1g IV, azithromycin 500mg IV, 125mg solumedrol IV. Plans to trial BI-pap in ED.  Patient's response to treatments and/or interventions: Pt states that his breathing has improved after nebs x3.     To be done/followed up on inpatient unit:  n/a    Does this patient have any cognitive concerns?: none    Activity level - Baseline/Home:  Independent  Activity Level - Current:   Independent    Patient's Preferred language: English   Needed?: No    Isolation: None  Infection: Not Applicable  Bariatric?: No    Vital Signs:   Vitals:    02/05/20 1353 02/05/20 1400 02/05/20 1433   BP: 125/81 117/77    Pulse:  148    Resp: 22     Temp: 102  F (38.9  C)  99.9  F (37.7  C)   TempSrc: Oral  Oral   SpO2: (!) 89% 90%    Weight: 77.1 kg (170 lb)     Height: 1.803 m (5' 11\")         Cardiac Rhythm:Cardiac Rhythm: Sinus tachycardia    Was the PSS-3 completed:   Yes  What interventions are required if any?               Family Comments: wife at bedside.  OBS brochure/video discussed/provided to patient/family: No              Name of person given brochure if not patient: n/a              Relationship to patient: n/a    For the majority of the shift this patient's behavior was Green. "   Behavioral interventions performed were n/a.    ED NURSE PHONE NUMBER: *[phone]

## 2020-02-05 NOTE — PHARMACY-ADMISSION MEDICATION HISTORY
Pharmacy Medication History  Admission medication history interview status for the 2/5/2020  admission is complete. See EPIC admission navigator for prior to admission medications     Medication history sources: Patient  Medication history source reliability: Good  Adherence assessment: Good    Significant changes made to the medication list:  none      Additional medication history information:   none    Medication reconciliation completed by provider prior to medication history? No    Time spent in this activity: 10min      Prior to Admission medications    Medication Sig Last Dose Taking? Auth Provider   albuterol (PROAIR HFA/PROVENTIL HFA/VENTOLIN HFA) 108 (90 Base) MCG/ACT inhaler Inhale 2 puffs into the lungs every 4 hours as needed for shortness of breath / dyspnea or wheezing 2/5/2020 at Unknown time Yes Sukh Wyatt MD

## 2020-02-05 NOTE — ED PROVIDER NOTES
"  History     Chief Complaint:  Shortness of Breath    HPI   Pradeep Swanson is a 49 year old male who presents with shortness of breath. The patient states he has been feeling shortness of breath since last night with a mild cough. The patient notes he felt feverish this morning but did not take his temperature. He also notes a headache which he tried to treat with Ibuprofen early this morning with no real relief. The patient denies abdominal pain or leg swelling.   The patient was seen in urgent care, received one nebulizer, and was sent to the ED for further evaluation when his O2 saturation was noted to he 86% on room air. The patient reports he was diagnosed with \"light asthma\" on 01/27/2020. He has been intermittently short of breath since that time with a cough for the last week. He states that he has an inhaler which he used last night with no relief but notes he is unsure if he is using it correctly. The patient does report that his son has been sick with a virus this week.     CARDIAC RISK FACTORS:  Sex:    male  Tobacco:   Former smoker  Hypertension:   no  Hyperlipidemia:  no  Diabetes:   no  Family History:  no    PE/DVT RISK FACTORS:  Sex:    male  Tobacco:   Former smoker  Travel:   no  Surgery:   no  Other immobilization: no  Personal history:  no  Family history:  no     Allergies:  No Known Drug Allergies     Medications:    Albuterol inhaler  Asmanex     Past Medical History:    Tobacco use disorder   Asthma     Past Surgical History:    Lasik    Family History:    Father: diabetes, heart attack     Social History:  The patient was accompanied to the ED by wife.  Smoking Status: former smoker  Smokeless Tobacco: Never Used  Marital Status:        Review of Systems   Constitutional: Positive for fever.   Respiratory: Positive for cough and shortness of breath.    Cardiovascular: Negative for leg swelling.   Gastrointestinal: Negative for abdominal pain.   Neurological: Positive for " "headaches.   All other systems reviewed and are negative.    Physical Exam     Patient Vitals for the past 24 hrs:   BP Temp Temp src Pulse Heart Rate Resp SpO2 Height Weight   02/05/20 2102 116/83 -- -- 136 -- -- 93 % -- --   02/05/20 2039 -- -- -- -- -- -- 93 % -- --   02/05/20 2035 116/83 -- -- 130 134 -- -- -- --   02/05/20 1916 (!) 140/85 -- -- -- -- -- -- -- --   02/05/20 1433 -- 99.9  F (37.7  C) Oral -- -- -- -- -- --   02/05/20 1400 117/77 -- -- 148 -- -- 90 % -- --   02/05/20 1353 125/81 102  F (38.9  C) Oral -- 144 22 (!) 89 % 1.803 m (5' 11\") 77.1 kg (170 lb)      Physical Exam  General: Alert and interactive. Tachypneic. Tachycardic.   Eyes: The pupils are equal and round. EOMs intact. No scleral icterus.  ENT: No abnormalities to the external nose or ears. Mucous membranes moist. Posterior oropharynx is non-erythematous.   Neck: Trachea is in the midline. No nuchal rigidity.     CV: Tachycardia. S1 and S2 normal without murmur, click, gallop or rub. No peripheral cyanosis.   Resp: Diffuse wheezing and lack of airflow. Breathing is labored.    GI: Abdomen is soft without distension. No tenderness to palpation. No peritoneal signs.    MS: Moving all extremities well. Good muscle tone.   Skin: Warm and dry. No rash or lesions noted.  Neuro: Alert and oriented x 3. No focal neurologic deficits. Good strength and sensation in upper and lower extremities.   Psych: Awake. Alert.  Normal affect. Appropriate interactions.  Lymph: No anterior or posterior cervical lymphadenopathy noted.    Emergency Department Course   ECG:  ECG taken at 1405, ECG read at 1552  Sinus tachycardia   Left posterior fascicular block  Abnormal ECG  Rate 139 bpm. WI interval 138 ms. QRS duration 80 ms. QT/QTc 280/426 ms. P-R-T axes 79 162 50.    Imaging:  Radiology findings were communicated with the patient who voiced understanding of the findings.    Chest XR,  PA & LAT  Probable COPD. No radiographic evidence of acute " chest  abnormality.   Reading per radiology    CT Chest pulmonary embolism   1. No evidence of pulmonary embolism.  2. Probable bronchitis with reactive hilar and mediastinal lymph  nodes.  3. Incidental pulmonary nodules measure a maximum of 6 cm.  Recommendations for one or multiple incidental lung nodules < 6mm :    Low risk patients: No routine follow-up.    High risk patients: Optional follow-up CT at 12 months; if  unchanged, no further follow-up.    *Low Risk: Minimal or absent history of smoking or other known risk  factors.  *Nonsolid (ground glass) or partly solid nodules may require longer  follow-up to exclude indolent adenocarcinoma.  *Recommendations based on Guidelines for the Management of Incidental  Pulmonary Nodules Detected at CT: From the Fleischner Society 2017,  Radiology 2017.  Reading per radiology    Laboratory:  Laboratory findings were communicated with the patient who voiced understanding of the findings.    Blood culture pending x2  CBC: WBC 13.6(H0, HGB 15.9,   CMP: glucose 113(H) o/w WNL (Creatinine 0.96)  Ddimer: 0.3  Lipase: 59(L)  Procalcitonin 0.27  ABO/Rh type and scree: O+, antibody negative   Magnesium: 1.9  Lactic acid whole blood(result time 1453) 1.5  Troponin (Collected 1444): <0.015     Influenza A/B antigen: both negative      Interventions:  1450 Solu-medrol 125 mg IV  1450 Duoneb 3 ml Neb  1515 NS 1000 mL IV  1547 Duoneb 3 ml Neb  1604 Duoneb 3 mL Neb  1605 magnesium 2 g IV  1650 Rocephin 1 g IV    Emergency Department Course:  Nursing notes and vitals reviewed.    1425 I performed an exam of the patient as documented above.     IV was inserted and blood was drawn for laboratory testing, results above.    Influenza A/B antigen obtained, results above.    The patient was sent for a XR Chest while in the emergency department, results above.      1537 I returned to update the patient.     1552 I discussed the case with my colleague Dr. Greene, who agreed to staff  patient with me.     The patient was sent for a CT Chest pulmonary embolism while in the emergency department, results above.      1716 I spoke with Dr. George of the Hospitalist service from [facility] regarding patient's presentation, findings, and plan of care.     Findings and plan explained to the Patient who consents to admission. Discussed the patient with Dr. George, who will admit the patient to an C bed for further monitoring, evaluation, and treatment.     Impression & Plan    Medical Decision Making:  Pradeep Swanson is a 49 year old male who presents to the emergency department today for evaluation of acute shortness of breath in the setting of week of viral upper respiratory symptoms. Upon evaluation here, the patient is tachycardic, febrile, tachypneic, and is working very hard to breathe. He was given 3 separate nebulizers, Rocephin, Azithromycin, Solu-Medrol, Magnesium, and still is continuing to work to breathe. He was initially on a nonrebreather mask and then able to step down to oxygen via nasal cannula at 10 L. However, given his continued tachypnea, the patient will be started on BiPAP and needs to be admitted to an IMC bed.      Considered pneumonia, PE, ACS, Pneumothorax, many others as etiology for his shortness of breath. He has a mild leukocytosis but no lactic acid elevation to suggest sepsis or septic shock. His troponin is negative and EKG shows tachycardia without any significant signs of ST segment changes to suggest ischemia or infarction. Influenza screen is negative. He is not anemic and has no electrolyte abnormalities. CT scan of the chest shows likely bronchitis and pulmonary nodule but no signs of PE and no occult pneumonia. Pulmonary nodule discussed with patient. Given symptoms, the patient needs admission to the hospital service. Discussed with Kevin George MD who suggested BiPAP and admission to IM. Patient agrees to admission. Will have Dr. Greene  continue monitor patient in department for worsening respiratory status.     Diagnosis:    ICD-10-CM    1. Acute respiratory distress R06.03    2. Hypoxia R09.02    3. Severe asthma with exacerbation, unspecified whether persistent J45.901    4. Acute bronchitis, unspecified organism J20.9    5. Tachycardia R00.0        Disposition:   The patient is admitted into the care of Dr. Doris Sosa Disclosure:  I, Dang Mahoney, am serving as a scribe at 2:27 PM on 2/5/2020 to document services personally performed by Cammie Vasquez PA-C based on my observations and the provider's statements to me.    EMERGENCY DEPARTMENT       Cammie Vasquez PA-C  02/05/20 1933       Cammie Vasquez PA-C  02/05/20 2239

## 2020-02-05 NOTE — ED TRIAGE NOTES
Pt sent from  for eval of SOB. Pt has had Cough for past two weeks. Last night, pt reports SOB and chills onset. Pt given Duoneb neb at .

## 2020-02-06 LAB
ANION GAP SERPL CALCULATED.3IONS-SCNC: 6 MMOL/L (ref 3–14)
BASOPHILS # BLD AUTO: 0 10E9/L (ref 0–0.2)
BASOPHILS NFR BLD AUTO: 0.1 %
BUN SERPL-MCNC: 13 MG/DL (ref 7–30)
CALCIUM SERPL-MCNC: 8.3 MG/DL (ref 8.5–10.1)
CHLORIDE SERPL-SCNC: 112 MMOL/L (ref 94–109)
CO2 SERPL-SCNC: 22 MMOL/L (ref 20–32)
CREAT SERPL-MCNC: 0.76 MG/DL (ref 0.66–1.25)
DIFFERENTIAL METHOD BLD: ABNORMAL
EOSINOPHIL # BLD AUTO: 0 10E9/L (ref 0–0.7)
EOSINOPHIL NFR BLD AUTO: 0 %
ERYTHROCYTE [DISTWIDTH] IN BLOOD BY AUTOMATED COUNT: 12.7 % (ref 10–15)
GFR SERPL CREATININE-BSD FRML MDRD: >90 ML/MIN/{1.73_M2}
GLUCOSE BLDC GLUCOMTR-MCNC: 127 MG/DL (ref 70–99)
GLUCOSE BLDC GLUCOMTR-MCNC: 143 MG/DL (ref 70–99)
GLUCOSE BLDC GLUCOMTR-MCNC: 164 MG/DL (ref 70–99)
GLUCOSE SERPL-MCNC: 173 MG/DL (ref 70–99)
GRAM STN SPEC: NORMAL
HCT VFR BLD AUTO: 40.6 % (ref 40–53)
HGB BLD-MCNC: 13.4 G/DL (ref 13.3–17.7)
IMM GRANULOCYTES # BLD: 0.1 10E9/L (ref 0–0.4)
IMM GRANULOCYTES NFR BLD: 0.3 %
LYMPHOCYTES # BLD AUTO: 0.5 10E9/L (ref 0.8–5.3)
LYMPHOCYTES NFR BLD AUTO: 2.5 %
Lab: NORMAL
MCH RBC QN AUTO: 30.1 PG (ref 26.5–33)
MCHC RBC AUTO-ENTMCNC: 33 G/DL (ref 31.5–36.5)
MCV RBC AUTO: 91 FL (ref 78–100)
MONOCYTES # BLD AUTO: 0.8 10E9/L (ref 0–1.3)
MONOCYTES NFR BLD AUTO: 4.1 %
NEUTROPHILS # BLD AUTO: 16.9 10E9/L (ref 1.6–8.3)
NEUTROPHILS NFR BLD AUTO: 93 %
NRBC # BLD AUTO: 0 10*3/UL
NRBC BLD AUTO-RTO: 0 /100
PLATELET # BLD AUTO: 216 10E9/L (ref 150–450)
POTASSIUM SERPL-SCNC: 3.9 MMOL/L (ref 3.4–5.3)
RBC # BLD AUTO: 4.45 10E12/L (ref 4.4–5.9)
SODIUM SERPL-SCNC: 140 MMOL/L (ref 133–144)
SPECIMEN SOURCE: NORMAL
WBC # BLD AUTO: 18.2 10E9/L (ref 4–11)

## 2020-02-06 PROCEDURE — 25800030 ZZH RX IP 258 OP 636: Performed by: HOSPITALIST

## 2020-02-06 PROCEDURE — 94640 AIRWAY INHALATION TREATMENT: CPT | Mod: 76

## 2020-02-06 PROCEDURE — 94640 AIRWAY INHALATION TREATMENT: CPT

## 2020-02-06 PROCEDURE — 40000275 ZZH STATISTIC RCP TIME EA 10 MIN

## 2020-02-06 PROCEDURE — 99232 SBSQ HOSP IP/OBS MODERATE 35: CPT | Performed by: INTERNAL MEDICINE

## 2020-02-06 PROCEDURE — 25000132 ZZH RX MED GY IP 250 OP 250 PS 637: Performed by: HOSPITALIST

## 2020-02-06 PROCEDURE — 00000146 ZZHCL STATISTIC GLUCOSE BY METER IP

## 2020-02-06 PROCEDURE — 87205 SMEAR GRAM STAIN: CPT | Performed by: HOSPITALIST

## 2020-02-06 PROCEDURE — 85025 COMPLETE CBC W/AUTO DIFF WBC: CPT | Performed by: HOSPITALIST

## 2020-02-06 PROCEDURE — 25000128 H RX IP 250 OP 636: Performed by: HOSPITALIST

## 2020-02-06 PROCEDURE — 87070 CULTURE OTHR SPECIMN AEROBIC: CPT | Performed by: HOSPITALIST

## 2020-02-06 PROCEDURE — 25000125 ZZHC RX 250: Performed by: HOSPITALIST

## 2020-02-06 PROCEDURE — 80048 BASIC METABOLIC PNL TOTAL CA: CPT | Performed by: HOSPITALIST

## 2020-02-06 PROCEDURE — 20000003 ZZH R&B ICU

## 2020-02-06 PROCEDURE — 36415 COLL VENOUS BLD VENIPUNCTURE: CPT | Performed by: HOSPITALIST

## 2020-02-06 RX ADMIN — METHYLPREDNISOLONE SODIUM SUCCINATE 62.5 MG: 125 INJECTION, POWDER, FOR SOLUTION INTRAMUSCULAR; INTRAVENOUS at 15:46

## 2020-02-06 RX ADMIN — Medication 1 MG: at 18:54

## 2020-02-06 RX ADMIN — OSELTAMIVIR PHOSPHATE 75 MG: 75 CAPSULE ORAL at 00:33

## 2020-02-06 RX ADMIN — METHYLPREDNISOLONE SODIUM SUCCINATE 62.5 MG: 125 INJECTION, POWDER, FOR SOLUTION INTRAMUSCULAR; INTRAVENOUS at 20:42

## 2020-02-06 RX ADMIN — LEVALBUTEROL HYDROCHLORIDE 1.25 MG: 1.25 SOLUTION, CONCENTRATE RESPIRATORY (INHALATION) at 13:20

## 2020-02-06 RX ADMIN — SODIUM CHLORIDE: 9 INJECTION, SOLUTION INTRAVENOUS at 16:16

## 2020-02-06 RX ADMIN — METHYLPREDNISOLONE SODIUM SUCCINATE 62.5 MG: 125 INJECTION, POWDER, FOR SOLUTION INTRAMUSCULAR; INTRAVENOUS at 09:44

## 2020-02-06 RX ADMIN — OSELTAMIVIR PHOSPHATE 75 MG: 75 CAPSULE ORAL at 09:45

## 2020-02-06 RX ADMIN — OSELTAMIVIR PHOSPHATE 75 MG: 75 CAPSULE ORAL at 20:42

## 2020-02-06 RX ADMIN — LEVALBUTEROL HYDROCHLORIDE 1.25 MG: 1.25 SOLUTION, CONCENTRATE RESPIRATORY (INHALATION) at 19:25

## 2020-02-06 RX ADMIN — LEVALBUTEROL HYDROCHLORIDE 1.25 MG: 1.25 SOLUTION, CONCENTRATE RESPIRATORY (INHALATION) at 08:18

## 2020-02-06 RX ADMIN — CEFTRIAXONE SODIUM 2 G: 2 INJECTION, POWDER, FOR SOLUTION INTRAMUSCULAR; INTRAVENOUS at 20:42

## 2020-02-06 RX ADMIN — SODIUM CHLORIDE: 9 INJECTION, SOLUTION INTRAVENOUS at 06:46

## 2020-02-06 RX ADMIN — ACETAMINOPHEN 650 MG: 325 TABLET, FILM COATED ORAL at 00:38

## 2020-02-06 RX ADMIN — AZITHROMYCIN MONOHYDRATE 250 MG: 500 INJECTION, POWDER, LYOPHILIZED, FOR SOLUTION INTRAVENOUS at 17:47

## 2020-02-06 RX ADMIN — LEVALBUTEROL HYDROCHLORIDE 1.25 MG: 1.25 SOLUTION, CONCENTRATE RESPIRATORY (INHALATION) at 00:06

## 2020-02-06 RX ADMIN — METHYLPREDNISOLONE SODIUM SUCCINATE 62.5 MG: 125 INJECTION, POWDER, FOR SOLUTION INTRAMUSCULAR; INTRAVENOUS at 04:08

## 2020-02-06 ASSESSMENT — ACTIVITIES OF DAILY LIVING (ADL)
ADLS_ACUITY_SCORE: 11
ADLS_ACUITY_SCORE: 10
ADLS_ACUITY_SCORE: 11
ADLS_ACUITY_SCORE: 8
ADLS_ACUITY_SCORE: 10
ADLS_ACUITY_SCORE: 12

## 2020-02-06 ASSESSMENT — MIFFLIN-ST. JEOR: SCORE: 1635.13

## 2020-02-06 NOTE — PLAN OF CARE
"Patient is alert and orientedx3. Resp status stable. O2 sat high 90\"s on high flow O2 at 50% and 35LPM. No shortness of breath noted. Vital signs stable.Taking PO fluids well.Voiding well. Palpable pulses. Continue to monitor.Jessica Henry, RN, BSN, BC, CCRN   "

## 2020-02-06 NOTE — PROGRESS NOTES
Pt stable on HFNC 40L 60% overnight, resting comfortably. BBS exp. Wheeze, SpO2 96%. Will continue to follow.     Kaiser Shah, RT

## 2020-02-06 NOTE — PROVIDER NOTIFICATION
Patient blood sugar 164.Patient is eating well and receiving methylprednislone. Notify Dr Flanagan re starting an insulin gtt or sliding scale.Cont. to monitor.Jessica Henry RN, BSN, BC, CCRN

## 2020-02-06 NOTE — ED PROVIDER NOTES
Emergency Department Attending Supervision Note  2/5/2020  9:54 PM      I evaluated this patient in conjunction with Cammie Vasquez PA-C and agree with her evaluation, exam, diagnosis, and disposition.      Briefly, the patient presented with shortness of breath.  He has a history of severe asthma and just felt really tight today.  No peripheral cyanosis is noted.      On my exam, patient was mildly tachypneic but talking in normal sentences.  His lungs revealed some expiratory wheezes bilaterally.  He was on mask oxygen with sats in the low 90s.  His troponin and comprehensive panel are normal.  His CBC shows an elevated white count of 13.6 with a left shift.  His d-dimer is normal.  2 sets of blood cultures were obtained and his lactate came back normal as well.  He received multiple duo nebs which helped transiently and then he would feel more short of breath.  His influenza swab was negative.  Chest x-ray did not reveal any evidence of pneumonia.  There was evidence on a CT scan of his chest of bronchitis but no hidden pneumonia or PE.  He was admitted to the hospitalist and before he went up to the floor, he felt more short of breath.  His oxygen level still in the low 90s.  He was given IM epinephrine along with 2 Xopenex nebs.  He looked more stable but still felt short of breath.  He is on BiPAP.  The patient is going to be admitted to the ICU and the intensivist has been notified by the hospitalist in case he takes a turn for the worse and requires intubation.  Arterial blood gases came back with a pH of 7.42, PCO2 of 32 and PO2 of 54.  He had received steroid in the ER.  The patient is stable upon transfer up to the ICU but will need very close monitoring as he still feels quite short of breath.  He also had received magnesium.     My critical care time was 50 minutes.      Diagnosis    ICD-10-CM    1. Acute respiratory distress R06.03 Blood culture     Blood culture     Blood gas arterial and oxyhgb      Influenza A and B and RSV PCR     Influenza A and B and RSV PCR     Glucose by meter     Glucose by meter   2. Hypoxia R09.02    3. Severe asthma with exacerbation, unspecified whether persistent J45.901    4. Acute bronchitis, unspecified organism J20.9    5. Tachycardia R00.0    6. Pulmonary nodules R91.8          MD Jc Sylvester Tracy Alan, MD  02/05/20 4374

## 2020-02-06 NOTE — PROGRESS NOTES
Patient was transported from ED to ICU on BiPAP with SpO2 in the mid 90's. Mepilex in place. Skin intact. Alarm volume set at 10.  Will cont to monitor.  2/5/2020   Marylu Granda, RT

## 2020-02-06 NOTE — PROGRESS NOTES
Called down to ER room 26 to place patient on BiPAP with settings of 9/5 an d 40%. SpO2 is 99%. Will continue to follow.

## 2020-02-06 NOTE — PLAN OF CARE
Patient oriented x4 but lethargic. Patient arrived on floor on continuous bipap. Was able to be switched over to HFNC @40 L 60%, tolerating well. Tele sinus tach upon arrival switching to NSR. Droplet precautions, influenza A. Tolerating drinks of water and swallowing meds. Using urinal at bedside. Nursing will continue to monitor.

## 2020-02-07 VITALS
HEIGHT: 71 IN | DIASTOLIC BLOOD PRESSURE: 84 MMHG | BODY MASS INDEX: 23.02 KG/M2 | SYSTOLIC BLOOD PRESSURE: 112 MMHG | OXYGEN SATURATION: 95 % | TEMPERATURE: 98.5 F | WEIGHT: 164.46 LBS | HEART RATE: 96 BPM | RESPIRATION RATE: 15 BRPM

## 2020-02-07 LAB
ANION GAP SERPL CALCULATED.3IONS-SCNC: 5 MMOL/L (ref 3–14)
BASE EXCESS BLDA CALC-SCNC: 0.4 MMOL/L
BUN SERPL-MCNC: 17 MG/DL (ref 7–30)
CALCIUM SERPL-MCNC: 8.5 MG/DL (ref 8.5–10.1)
CHLORIDE SERPL-SCNC: 111 MMOL/L (ref 94–109)
CO2 SERPL-SCNC: 23 MMOL/L (ref 20–32)
CREAT SERPL-MCNC: 0.67 MG/DL (ref 0.66–1.25)
ERYTHROCYTE [DISTWIDTH] IN BLOOD BY AUTOMATED COUNT: 12.9 % (ref 10–15)
GFR SERPL CREATININE-BSD FRML MDRD: >90 ML/MIN/{1.73_M2}
GLUCOSE BLDC GLUCOMTR-MCNC: 141 MG/DL (ref 70–99)
GLUCOSE BLDC GLUCOMTR-MCNC: 144 MG/DL (ref 70–99)
GLUCOSE BLDC GLUCOMTR-MCNC: 145 MG/DL (ref 70–99)
GLUCOSE SERPL-MCNC: 165 MG/DL (ref 70–99)
HCO3 BLD-SCNC: 24 MMOL/L (ref 21–28)
HCT VFR BLD AUTO: 39.2 % (ref 40–53)
HGB BLD-MCNC: 12.9 G/DL (ref 13.3–17.7)
MCH RBC QN AUTO: 30.2 PG (ref 26.5–33)
MCHC RBC AUTO-ENTMCNC: 32.9 G/DL (ref 31.5–36.5)
MCV RBC AUTO: 92 FL (ref 78–100)
O2/TOTAL GAS SETTING VFR VENT: ABNORMAL %
OXYHGB MFR BLD: 98 % (ref 92–100)
PCO2 BLD: 37 MM HG (ref 35–45)
PH BLD: 7.43 PH (ref 7.35–7.45)
PLATELET # BLD AUTO: 216 10E9/L (ref 150–450)
PO2 BLD: 124 MM HG (ref 80–105)
POTASSIUM SERPL-SCNC: 4.3 MMOL/L (ref 3.4–5.3)
RBC # BLD AUTO: 4.27 10E12/L (ref 4.4–5.9)
SODIUM SERPL-SCNC: 139 MMOL/L (ref 133–144)
WBC # BLD AUTO: 19.3 10E9/L (ref 4–11)

## 2020-02-07 PROCEDURE — 00000146 ZZHCL STATISTIC GLUCOSE BY METER IP

## 2020-02-07 PROCEDURE — 25000128 H RX IP 250 OP 636: Performed by: HOSPITALIST

## 2020-02-07 PROCEDURE — 85027 COMPLETE CBC AUTOMATED: CPT | Performed by: INTERNAL MEDICINE

## 2020-02-07 PROCEDURE — 82805 BLOOD GASES W/O2 SATURATION: CPT | Performed by: INTERNAL MEDICINE

## 2020-02-07 PROCEDURE — 25000132 ZZH RX MED GY IP 250 OP 250 PS 637: Performed by: HOSPITALIST

## 2020-02-07 PROCEDURE — 25000131 ZZH RX MED GY IP 250 OP 636 PS 637: Performed by: INTERNAL MEDICINE

## 2020-02-07 PROCEDURE — 25000125 ZZHC RX 250: Performed by: HOSPITALIST

## 2020-02-07 PROCEDURE — 36415 COLL VENOUS BLD VENIPUNCTURE: CPT | Performed by: INTERNAL MEDICINE

## 2020-02-07 PROCEDURE — 25800030 ZZH RX IP 258 OP 636: Performed by: HOSPITALIST

## 2020-02-07 PROCEDURE — 99238 HOSP IP/OBS DSCHRG MGMT 30/<: CPT | Performed by: INTERNAL MEDICINE

## 2020-02-07 PROCEDURE — 94640 AIRWAY INHALATION TREATMENT: CPT

## 2020-02-07 PROCEDURE — 40000275 ZZH STATISTIC RCP TIME EA 10 MIN

## 2020-02-07 PROCEDURE — 80048 BASIC METABOLIC PNL TOTAL CA: CPT | Performed by: INTERNAL MEDICINE

## 2020-02-07 RX ORDER — AZITHROMYCIN 250 MG/1
250 TABLET, FILM COATED ORAL DAILY
Qty: 4 TABLET | Refills: 0 | Status: SHIPPED | OUTPATIENT
Start: 2020-02-07 | End: 2020-02-11

## 2020-02-07 RX ORDER — OSELTAMIVIR PHOSPHATE 75 MG/1
75 CAPSULE ORAL 2 TIMES DAILY
Qty: 8 CAPSULE | Refills: 0 | Status: SHIPPED | OUTPATIENT
Start: 2020-02-07 | End: 2020-02-11

## 2020-02-07 RX ORDER — PREDNISONE 20 MG/1
20 TABLET ORAL DAILY
Status: DISCONTINUED | OUTPATIENT
Start: 2020-02-07 | End: 2020-02-07 | Stop reason: HOSPADM

## 2020-02-07 RX ORDER — PREDNISONE 20 MG/1
20 TABLET ORAL DAILY
Qty: 5 TABLET | Refills: 0 | Status: SHIPPED | OUTPATIENT
Start: 2020-02-08 | End: 2020-02-13

## 2020-02-07 RX ADMIN — SODIUM CHLORIDE: 9 INJECTION, SOLUTION INTRAVENOUS at 03:41

## 2020-02-07 RX ADMIN — PREDNISONE 20 MG: 20 TABLET ORAL at 11:42

## 2020-02-07 RX ADMIN — OSELTAMIVIR PHOSPHATE 75 MG: 75 CAPSULE ORAL at 09:23

## 2020-02-07 RX ADMIN — METHYLPREDNISOLONE SODIUM SUCCINATE 62.5 MG: 125 INJECTION, POWDER, FOR SOLUTION INTRAMUSCULAR; INTRAVENOUS at 09:23

## 2020-02-07 RX ADMIN — METHYLPREDNISOLONE SODIUM SUCCINATE 62.5 MG: 125 INJECTION, POWDER, FOR SOLUTION INTRAMUSCULAR; INTRAVENOUS at 03:32

## 2020-02-07 RX ADMIN — LEVALBUTEROL HYDROCHLORIDE 1.25 MG: 1.25 SOLUTION, CONCENTRATE RESPIRATORY (INHALATION) at 02:47

## 2020-02-07 RX ADMIN — LEVALBUTEROL HYDROCHLORIDE 1.25 MG: 1.25 SOLUTION, CONCENTRATE RESPIRATORY (INHALATION) at 08:23

## 2020-02-07 ASSESSMENT — ACTIVITIES OF DAILY LIVING (ADL)
ADLS_ACUITY_SCORE: 10
ADLS_ACUITY_SCORE: 12
ADLS_ACUITY_SCORE: 10
ADLS_ACUITY_SCORE: 12

## 2020-02-07 ASSESSMENT — MIFFLIN-ST. JEOR: SCORE: 1628.13

## 2020-02-07 NOTE — PROGRESS NOTES
Pt stable on HFNC 40L 50% overnight, resting comfortably. BBS exp. Wheeze, SpO2 96%. Will continue to follow.      Kaiser Shah, RT

## 2020-02-07 NOTE — PROGRESS NOTES
Patient is discharge home. Awaiting for his wife to pick him up.Vital signs stable.Resp status stable.Remain on room air,O2 sat 94%. Continue to monitor.Jessica Henry RN, BSN, BC, CCRN

## 2020-02-07 NOTE — DISCHARGE SUMMARY
[facility]  Discharge Summary        Pradeep Swanson MRN# [id number]   YOB: 1970 Age: 49 year old     Date of Admission:  2/5/2020  Date of Discharge:  2/7/2020  Admitting Physician:  Kevin George MD  Discharge Physician: Peter Flanagan MD  Discharging Service: Hospitalist     Primary Provider:  Javon Rey  Primary Care Physician Phone Number: [phone]         Discharge Diagnoses/Problem Oriented Hospital Course (Providers):    Pradeep Swanson was admitted on 2/5/2020 by Kevin George MD and I would refer you to their history and physical.  The following problems were addressed during his hospitalization:    Pradeep Swanson is a 49 year old male with PMH significant for asthma who presented to ER with shortness of breath     #Acute hypoxic respiratory failure secondary to severe asthma exacerbation  #fever, likely bronchitis  - he is febrile to 102, tachycardic to 130s, wbc 13.6, protocol 0.27, normal lactate;  and on 10 L oxymask will initially requiring non-rebreather  -admit as inpatient; getting ABG in ER and will have a trial of BiPAP; if remains stable off BiPAP-- med-tele, otherwise will admit to IMC for close monitoring   -albut mdi, prednisone   -CT chest showed no PE, did show likely bronchitis with hilar lymphadenopathy  -complete 5 days of azithromycin   -Tylenol PRN for fever; negative blood culture; procal is 0.27  - off oxygen, normal abg     Influenza A  -- continue tamiflu     #Incidental pulmonary nodule  -noted lung nodule maximum 6 mm size  -he quit smoking in 1997, low risk, no routine follow-up needed per radiology            Code Status:      Full Code         Important Results:      NAD OFF OXYGEN  HEENT NORMAL  PULM MILD WHEEZING  CV RRR  GI SOFT  MS NO EDEMA  NEURO NON FOCAL  DERM WARM AND PERFUSED         Pending Results:        Unresulted Labs Ordered in the Past 30 Days of this Admission     Date and Time Order Name Status  Description    2/5/2020 2115 Sputum Culture Aerobic Bacterial In process     2/5/2020 1432 Blood culture Preliminary     2/5/2020 1432 Blood culture Preliminary                Discharge Instructions and Follow-Up:      Follow-up Appointments     Follow-up and recommended labs and tests       Follow up with pcp in 1 week if not better                  Discharge Disposition:      Discharged to home         Discharge Medications:        Current Discharge Medication List      START taking these medications    Details   azithromycin (ZITHROMAX) 250 MG tablet Take 1 tablet (250 mg) by mouth daily for 4 days  Qty: 4 tablet, Refills: 0    Associated Diagnoses: Severe asthma with exacerbation, unspecified whether persistent      oseltamivir (TAMIFLU) 75 MG capsule Take 1 capsule (75 mg) by mouth 2 times daily for 4 days  Qty: 8 capsule, Refills: 0    Associated Diagnoses: Influenza A      predniSONE (DELTASONE) 20 MG tablet Take 1 tablet (20 mg) by mouth daily for 5 days  Qty: 5 tablet, Refills: 0    Associated Diagnoses: Severe asthma with exacerbation, unspecified whether persistent         CONTINUE these medications which have NOT CHANGED    Details   albuterol (PROAIR HFA/PROVENTIL HFA/VENTOLIN HFA) 108 (90 Base) MCG/ACT inhaler Inhale 2 puffs into the lungs every 4 hours as needed for shortness of breath / dyspnea or wheezing  Qty: 8.5 g, Refills: 1    Associated Diagnoses: Acute bronchospasm                  Allergies:       No Known Allergies         Consultations This Hospital Stay:      No consultations were requested during this admission          Discharge Orders      After Care Instructions     Activity      Your activity upon discharge: activity as tolerated         Diet      Follow this diet upon discharge: Orders Placed This Encounter      Regular Diet Adult                    Discharge Time:      LESS than 30 minutes.        Image Results From This Hospital Stay (For Non-EPIC Providers):        Results for  orders placed or performed during the hospital encounter of 02/05/20   Chest XR,  PA & LAT    Narrative    CHEST TWO VIEWS 2/5/2020 3:20 PM     HISTORY: Shortness of breath.    COMPARISON: None.    FINDINGS: The lungs are hyperexpanded. No airspace consolidation,  pneumothorax, or pleural effusion. Heart size normal.       Impression    IMPRESSION: Probable COPD. No radiographic evidence of acute chest  abnormality.     ANDREINA MORRIS MD   CT Chest Pulmonary Embolism w Contrast    Narrative    CT CHEST PULMONARY EMBOLISM WITH CONTRAST   2/5/2020 4:50 PM     HISTORY: Dyspnea, chronic, initial exam. Dyspnea, fever. Assess for  occult pneumonia versus pulmonary embolism.    TECHNIQUE: 65mL Isovue-370. Radiation dose for this scan was reduced  using automated exposure control, adjustment of the mA and/or kV  according to patient size, or iterative reconstruction technique.    COMPARISON: None.    FINDINGS: No evidence of pulmonary embolism or acute thoracic aortic  abnormality. No pneumothorax. No pleural or pericardial effusion.    There is peribronchial thickening and numerous areas of mucous  impaction in several airways throughout both lungs. Thoracic  adenopathy is present. A right hilar lymph node measures 2.0 x 1.5 cm  (series 3, image 67). A subcarinal lymph node measures 1.6 x 1.3 cm.  Left hilar lymph nodes are prominent, but not pathologically enlarged.  No axillary adenopathy. Thyroid gland is unremarkable.    4 mm nodule in the right upper lobe (series 5, image 52). 6 mm  groundglass nodule in the right middle lobe (series 5, image 128). A  few other tiny nodules measure less than 4 mm.    Images through the upper abdomen demonstrate no worrisome abnormality.  Visualized bones are unremarkable.      Impression    IMPRESSION:  1. No evidence of pulmonary embolism.  2. Probable bronchitis with reactive hilar and mediastinal lymph  nodes.  3. Incidental pulmonary nodules measure a maximum of 6  cm.  Recommendations for one or multiple incidental lung nodules < 6mm :    Low risk patients: No routine follow-up.    High risk patients: Optional follow-up CT at 12 months; if  unchanged, no further follow-up.    *Low Risk: Minimal or absent history of smoking or other known risk  factors.  *Nonsolid (ground glass) or partly solid nodules may require longer  follow-up to exclude indolent adenocarcinoma.  *Recommendations based on Guidelines for the Management of Incidental  Pulmonary Nodules Detected at CT: From the Fleischner Society 2017,  Radiology 2017.    ANDREINA MORRIS MD           Most Recent Lab Results In EPIC (For Non-EPIC Providers):    Most Recent 3 CBC's:  Recent Labs   Lab Test 02/07/20  0455 02/06/20  0451 02/05/20  1444   WBC 19.3* 18.2* 13.6*   HGB 12.9* 13.4 15.9   MCV 92 91 91    216 232      Most Recent 3 BMP's:  Recent Labs   Lab Test 02/07/20  0455 02/06/20  0451 02/05/20  1444    140 138   POTASSIUM 4.3 3.9 3.6   CHLORIDE 111* 112* 107   CO2 23 22 25   BUN 17 13 16   CR 0.67 0.76 0.96   ANIONGAP 5 6 6   RORO 8.5 8.3* 9.4   * 173* 113*     Most Recent 3 Troponin's:  Recent Labs   Lab Test 02/05/20  1444   TROPI <0.015     Most Recent 3 INR's:No lab results found.  Most Recent 2 LFT's:  Recent Labs   Lab Test 02/05/20  1444   AST 16   ALT 35   ALKPHOS 66   BILITOTAL 0.3     Most Recent Cholesterol Panel:No lab results found.  Most Recent 6 Bacteria Isolates From Any Culture (See EPIC Reports for Culture Details):  Recent Labs   Lab Test 02/05/20  1515 02/05/20  1444   CULT No growth after 2 days No growth after 2 days     Most Recent TSH, T4 and HgbA1c:No lab results found.

## 2020-02-07 NOTE — PLAN OF CARE
A&Ox4. VSS on high flow. Denies any shortness of breath. Rested comfortably overnight. Tolerating regular diet. Voiding adequately in urinal. NS infusing at 100 ml/hr. Will continue to monitor.

## 2020-02-07 NOTE — PROGRESS NOTES
"[facility]  Hospitalist Progress Note  Peter Flanagan MD  02/06/2020    Assessment & Plan   Pradeep Swanson is a 49 year old male with PMH significant for asthma who presented to ER with shortness of breath     #Acute hypoxic respiratory failure secondary to severe asthma exacerbation  #fever, likely bronchitis  - he is febrile to 102, tachycardic to 130s, wbc 13.6, protocol 0.27, normal lactate;  and on 10 L oxymask will initially requiring non-rebreather  -admit as inpatient; getting ABG in ER and will have a trial of BiPAP; if remains stable off BiPAP-- med-tele, otherwise will admit to IM for close monitoring   -Xopenex nebs Q6 hours scheduled ; duo nebs Q4 hours PRN; Solu-Medrol 60 mg IV Q6 hours  -CT chest showed no PE, did show likely bronchitis with hilar lymphadenopathy  -started on empiric Rocephin and azithromycin, will attempt a brief course pending microbiology  -Tylenol PRN for fever; negative blood culture; procal is 0.27  - on high flow oxygen, wean as able., check abg in am    Influenza A  -- continue tamiflu     #Incidental pulmonary nodule  -noted lung nodule maximum 6 mm size  -he quit smoking in 1997, low risk, no routine follow-up needed per radiology     # DVT prophylaxis: mechanical with PCD boots  # Code status: full code    Interval History   -- chart reviewed  -- noted overnight + for influenza and started on tamilflu    -Data reviewed today: I reviewed all new labs and imaging over the last 24 hours. I personally reviewed no images or EKG's today.    Physical Exam   Heart Rate: 95, Blood pressure 113/67, pulse 98, temperature 97.9  F (36.6  C), temperature source Oral, resp. rate 15, height 1.803 m (5' 11\"), weight 75.3 kg (166 lb 0.1 oz), SpO2 96 %.  Vitals:    02/05/20 1353 02/05/20 2200 02/06/20 0100   Weight: 77.1 kg (170 lb) 75.1 kg (165 lb 9.1 oz) 75.3 kg (166 lb 0.1 oz)     Vital Signs with Ranges  Temp:  [96.5  F (35.8  C)-99.1  F (37.3  C)] 97.9  F (36.6 "  C)  Pulse:  [] 98  Heart Rate:  [] 95  Resp:  [12-29] 15  BP: ()/(60-89) 113/67  FiO2 (%):  [40 %-60 %] 50 %  SpO2:  [91 %-99 %] 96 %  I/O's Last 24 hours  I/O last 3 completed shifts:  In: 1773.33 [I.V.:1773.33]  Out: 1450 [Urine:1450]    Constitutional: Awake, alert, cooperative, no apparent distress  Respiratory: Diffuse wheezing bilaterally anterior and posterior with limited a/e  Cardiovascular: tachy, regular  GI: Normal bowel sounds, soft, non-distended, non-tender  Skin/Integumen: No rashes, no cyanosis, no edema  Other:      Medications   All medications were reviewed.    - MEDICATION INSTRUCTIONS -       sodium chloride 100 mL/hr at 02/06/20 1616       azithromycin  250 mg Intravenous Q24H     cefTRIAXone  2 g Intravenous Q24H     levalbuterol  1.25 mg Nebulization Q6H     methylPREDNISolone  62.5 mg Intravenous Q6H     oseltamivir  75 mg Oral BID     sodium chloride (PF)  3 mL Intracatheter Q8H        Data   Recent Labs   Lab 02/06/20  0451 02/05/20  1444   WBC 18.2* 13.6*   HGB 13.4 15.9   MCV 91 91    232    138   POTASSIUM 3.9 3.6   CHLORIDE 112* 107   CO2 22 25   BUN 13 16   CR 0.76 0.96   ANIONGAP 6 6   RORO 8.3* 9.4   * 113*   ALBUMIN  --  4.6   PROTTOTAL  --  8.3   BILITOTAL  --  0.3   ALKPHOS  --  66   ALT  --  35   AST  --  16   LIPASE  --  59*   TROPI  --  <0.015       No results found for this or any previous visit (from the past 24 hour(s)).    Peter Flanagan MD  Text Page  (7am to 6pm)

## 2020-02-07 NOTE — PLAN OF CARE
"Patient is alert and oriented x4.Vital signs stable. ABG\"s is ok. Weaned off from hi flow O2. O2 sat 95% on room air.No resp distress noted.Eating well.Voiding well. Continue to monitor.Jessica Henry RN, BSN, BC, CCRN   "

## 2020-02-08 LAB
BACTERIA SPEC CULT: NORMAL
SPECIMEN SOURCE: NORMAL

## 2020-02-11 LAB
BACTERIA SPEC CULT: NO GROWTH
BACTERIA SPEC CULT: NO GROWTH
SPECIMEN SOURCE: NORMAL
SPECIMEN SOURCE: NORMAL